# Patient Record
Sex: FEMALE | Race: WHITE | Employment: OTHER | URBAN - METROPOLITAN AREA
[De-identification: names, ages, dates, MRNs, and addresses within clinical notes are randomized per-mention and may not be internally consistent; named-entity substitution may affect disease eponyms.]

---

## 2023-01-24 PROCEDURE — 99309 SBSQ NF CARE MODERATE MDM 30: CPT | Performed by: INTERNAL MEDICINE

## 2023-02-01 ENCOUNTER — OUTSIDE SERVICES (OUTPATIENT)
Dept: PRIMARY CARE CLINIC | Age: 83
End: 2023-02-01

## 2023-02-01 DIAGNOSIS — S79.002D: Primary | ICD-10-CM

## 2023-02-01 DIAGNOSIS — R53.1 WEAKNESS: ICD-10-CM

## 2023-02-01 DIAGNOSIS — F02.B2 MODERATE ALZHEIMER'S DEMENTIA WITH PSYCHOTIC DISTURBANCE, UNSPECIFIED TIMING OF DEMENTIA ONSET (HCC): ICD-10-CM

## 2023-02-01 DIAGNOSIS — G30.9 MODERATE ALZHEIMER'S DEMENTIA WITH PSYCHOTIC DISTURBANCE, UNSPECIFIED TIMING OF DEMENTIA ONSET (HCC): ICD-10-CM

## 2023-02-01 DIAGNOSIS — U07.1 COVID-19: ICD-10-CM

## 2023-02-01 DIAGNOSIS — E03.9 HYPOTHYROIDISM, UNSPECIFIED TYPE: ICD-10-CM

## 2023-03-01 ASSESSMENT — ENCOUNTER SYMPTOMS: COUGH: 1

## 2023-03-01 ASSESSMENT — VISUAL ACUITY: OU: 1

## 2023-03-01 NOTE — PROGRESS NOTES
Visit Date: 2/1/23  Gino Martini  1940  female [de-identified] y.o. Subjective:    CC: Patient presents with left femoral neck fracture and covid. Patient presents for follow up of dementia, weakness, and hypothyroidism. HPI:  Hip Pain   The incident occurred at home. The injury mechanism was a fall (had a fall at home and was found to have a displaced femerol neck fracture had surgery done and admitted to the nursing home for rehab). The pain is present in the left hip. The quality of the pain is described as aching. The pain is at a severity of 3/10. The pain is mild. The pain has been Constant since onset. Associated symptoms include muscle weakness. She reports no foreign bodies present. The treatment provided mild relief. Memory Loss    Patient reports onset of memory loss was more than 1 year ago. Onset quality is gradual.     Symptoms associated with memory loss include changes in short-term memory, changes in long-term memory, difficulty recalling words and disorientation outside familiar environments. Patient lives in a/an family member's home. Cough  This is a new (covid +) problem. The current episode started in the past 7 days. The problem has been gradually improving. Extremity Weakness  This is a new problem. The current episode started in the past 7 days. The problem occurs intermittently. The problem has been gradually improving. Associated symptoms include coughing. Treatments tried: pt/ot eval. The treatment provided mild relief. Thyroid Problem  Presents for follow-up (hypothyroidism) visit. The symptoms have been stable. Her past medical history is significant for dementia. ROS:  Review of Systems   Unable to perform ROS: Dementia   Respiratory:  Positive for cough. Current Meds: Refer to nursing home record    PMH:    Medical Problems:   No past medical history on file. Unable to obtain due to dementia     Surgical Hx:   No past surgical history on file.  Unable to obtain due to dementia     FH:   No family history on file. Unable to obtain due to dementia     SH: Unable to obtain due to dementia          Objective: Wt: 94 BP: 126/78 Pulse: 70 Resp: 18 T: 97.9F     Physical Exam:  Physical Exam  Vitals reviewed. Constitutional:       General: She is awake. She is not in acute distress. Appearance: She is underweight. She is not ill-appearing or toxic-appearing. Comments: Confused Appears appropriate for age   HENT:      Head: Normocephalic and atraumatic. Right Ear: Tympanic membrane and external ear normal.      Left Ear: Tympanic membrane and external ear normal.      Ears:      Comments: Middle ear well aerated. Nose: Nose normal.      Mouth/Throat:      Mouth: Mucous membranes are moist.      Dentition: Normal dentition. No gingival swelling or dental caries. Pharynx: Oropharynx is clear. Uvula midline. Comments: Gums appear healthy. No thrush no mucositis  Eyes:      General: Vision grossly intact. Extraocular Movements: Extraocular movements intact. Conjunctiva/sclera: Conjunctivae normal.      Pupils: Pupils are equal, round, and reactive to light. Comments: Fundoscopic exam is benign  Retinal vessels: Normal   Neck:      Vascular: No carotid bruit. Comments: Thyroid is normal in size. Carotids 2+ and equal bilaterally  Cardiovascular:      Rate and Rhythm: Normal rate and regular rhythm. Pulses: Normal pulses. Heart sounds: Normal heart sounds, S1 normal and S2 normal. No murmur heard. No friction rub. No gallop. Comments: Pedal pulses 2+ and equal bilaterally  Pulmonary:      Effort: Pulmonary effort is normal.      Breath sounds: Normal breath sounds. Abdominal:      General: Bowel sounds are normal. There is no distension. Palpations: Abdomen is soft. There is no mass. Tenderness: There is no abdominal tenderness. There is no guarding or rebound. Hernia: No hernia is present.       Comments: No rigidity   Musculoskeletal:         General: Normal range of motion. Right shoulder: Normal.      Left shoulder: Normal.      Right upper arm: Normal.      Left upper arm: Normal.      Cervical back: Normal range of motion. Right hip: Normal.      Left hip: Normal.      Right upper leg: Normal.      Left upper leg: Normal.      Right lower leg: Normal.      Left lower leg: Normal.      Right foot: Normal.      Left foot: Normal.      Comments: Incision on left hip healing well    Lymphadenopathy:      Comments: No palpable or visible regional lymphadenopathy   Skin:     General: Skin is warm and dry. Findings: No bruising, ecchymosis, lesion or rash. Neurological:      General: No focal deficit present. Mental Status: Mental status is at baseline. Cranial Nerves: No cranial nerve deficit. Deep Tendon Reflexes: Reflexes normal.   Psychiatric:         Mood and Affect: Mood and affect normal. Mood is not depressed. Behavior: Behavior is not agitated. Behavior is cooperative. Cognition and Memory: Cognition and memory normal.      Comments: No apparent anxiety       Assessment & Plan:  1. Physeal fracture of proximal end of left femur with routine healing, unspecified physeal fracture configuration, subsequent encounter  2. Moderate Alzheimer's dementia with psychotic disturbance, unspecified timing of dementia onset (HCC)  3. Weakness  4. COVID-19  5. Hypothyroidism, unspecified type    Hospital notes reviewed   Chart and medications reviewed   Pt/ot eval and treat   Check labs   Continue current treatment   Advanced directives as per family she is a dnrcc      Roland BRIONES MA  am scribing for Dr. Jailene Galvan.  Helen Medrano, [employer]     IJan MD, personally performed the services described in this documentation as scribed by Roland Pace and it is both accurate and complete

## 2023-03-08 ENCOUNTER — OUTSIDE SERVICES (OUTPATIENT)
Dept: PRIMARY CARE CLINIC | Age: 83
End: 2023-03-08

## 2023-03-08 DIAGNOSIS — R53.1 WEAKNESS: ICD-10-CM

## 2023-03-08 DIAGNOSIS — E03.9 HYPOTHYROIDISM, UNSPECIFIED TYPE: ICD-10-CM

## 2023-03-08 DIAGNOSIS — F02.B2 MODERATE ALZHEIMER'S DEMENTIA WITH PSYCHOTIC DISTURBANCE, UNSPECIFIED TIMING OF DEMENTIA ONSET (HCC): ICD-10-CM

## 2023-03-08 DIAGNOSIS — S79.002D: Primary | ICD-10-CM

## 2023-03-08 DIAGNOSIS — G30.9 MODERATE ALZHEIMER'S DEMENTIA WITH PSYCHOTIC DISTURBANCE, UNSPECIFIED TIMING OF DEMENTIA ONSET (HCC): ICD-10-CM

## 2023-04-26 ASSESSMENT — VISUAL ACUITY: OU: 1

## 2023-05-16 ENCOUNTER — OUTSIDE SERVICES (OUTPATIENT)
Dept: PRIMARY CARE CLINIC | Age: 83
End: 2023-05-16
Payer: MEDICARE

## 2023-05-16 DIAGNOSIS — E03.9 HYPOTHYROIDISM, UNSPECIFIED TYPE: ICD-10-CM

## 2023-05-16 DIAGNOSIS — G30.9 MODERATE ALZHEIMER'S DEMENTIA WITH PSYCHOTIC DISTURBANCE, UNSPECIFIED TIMING OF DEMENTIA ONSET (HCC): Primary | ICD-10-CM

## 2023-05-16 DIAGNOSIS — F02.B2 MODERATE ALZHEIMER'S DEMENTIA WITH PSYCHOTIC DISTURBANCE, UNSPECIFIED TIMING OF DEMENTIA ONSET (HCC): Primary | ICD-10-CM

## 2023-05-16 DIAGNOSIS — R53.1 WEAKNESS: ICD-10-CM

## 2023-05-16 PROCEDURE — 99309 SBSQ NF CARE MODERATE MDM 30: CPT | Performed by: INTERNAL MEDICINE

## 2023-05-30 ASSESSMENT — VISUAL ACUITY: OU: 1

## 2023-05-30 NOTE — PROGRESS NOTES
Visit Date: 4/12/23  Eddie [place]  1940  female 82 y.o. Subjective:    CC: Patient presents with left femoral neck fracture and covid. Patient presents for follow up of dementia, weakness, and hypothyroidism. HPI:  Hip Pain   The incident occurred at home. The injury mechanism was a fall (had a fall at home and was found to have a displaced femerol neck fracture had surgery done she is working with therapy she is getting better slowly sometimes does not want to participate in therapy due to her dementia). The pain is present in the left hip. The quality of the pain is described as aching. The pain is at a severity of 0/10. The pain is mild. The pain has been Constant since onset. Associated symptoms include muscle weakness. She reports no foreign bodies present. Treatments tried: working with therapy. The treatment provided mild relief. Memory Loss    Patient reports onset of memory loss was more than 1 year ago. Onset quality is gradual.     Symptoms associated with memory loss include changes in short-term memory, changes in long-term memory, difficulty recalling words and disorientation outside familiar environments. Patient lives in a/an family member's home. Extremity Weakness  This is a new problem. The current episode started more than 1 month ago. The problem occurs intermittently. The problem has been gradually improving. Treatments tried: working with therapy. The treatment provided mild relief. Thyroid Problem  Presents for follow-up (hypothyroidism) visit. The symptoms have been stable. Her past medical history is significant for dementia. ROS:  Review of Systems   Unable to perform ROS: Dementia      Current Meds: Refer to nursing home record    PMH:    Medical Problems:   No past medical history on file. Unable to obtain due to dementia     Surgical Hx:   No past surgical history on file. Unable to obtain due to dementia     FH:   No family history on file.  Unable to obtain due to

## 2023-06-13 ASSESSMENT — VISUAL ACUITY: OU: 1

## 2023-06-21 ENCOUNTER — OUTSIDE SERVICES (OUTPATIENT)
Dept: PRIMARY CARE CLINIC | Age: 83
End: 2023-06-21
Payer: MEDICARE

## 2023-06-21 DIAGNOSIS — E03.9 HYPOTHYROIDISM, UNSPECIFIED TYPE: ICD-10-CM

## 2023-06-21 DIAGNOSIS — F02.B2 MODERATE ALZHEIMER'S DEMENTIA WITH PSYCHOTIC DISTURBANCE, UNSPECIFIED TIMING OF DEMENTIA ONSET (HCC): Primary | ICD-10-CM

## 2023-06-21 DIAGNOSIS — R53.1 WEAKNESS: ICD-10-CM

## 2023-06-21 DIAGNOSIS — G30.9 MODERATE ALZHEIMER'S DEMENTIA WITH PSYCHOTIC DISTURBANCE, UNSPECIFIED TIMING OF DEMENTIA ONSET (HCC): Primary | ICD-10-CM

## 2023-06-21 PROCEDURE — 99309 SBSQ NF CARE MODERATE MDM 30: CPT | Performed by: INTERNAL MEDICINE

## 2023-06-28 ASSESSMENT — VISUAL ACUITY: OU: 1

## 2023-07-05 ENCOUNTER — OUTSIDE SERVICES (OUTPATIENT)
Dept: PRIMARY CARE CLINIC | Age: 83
End: 2023-07-05
Payer: MEDICARE

## 2023-07-05 DIAGNOSIS — R13.12 DYSPHAGIA, OROPHARYNGEAL PHASE: Primary | ICD-10-CM

## 2023-07-05 PROCEDURE — 99308 SBSQ NF CARE LOW MDM 20: CPT | Performed by: INTERNAL MEDICINE

## 2023-07-12 ENCOUNTER — OUTSIDE SERVICES (OUTPATIENT)
Dept: PRIMARY CARE CLINIC | Age: 83
End: 2023-07-12
Payer: MEDICARE

## 2023-07-12 DIAGNOSIS — G30.9 MODERATE ALZHEIMER'S DEMENTIA WITH PSYCHOTIC DISTURBANCE, UNSPECIFIED TIMING OF DEMENTIA ONSET (HCC): ICD-10-CM

## 2023-07-12 DIAGNOSIS — R53.1 WEAKNESS: ICD-10-CM

## 2023-07-12 DIAGNOSIS — E03.9 HYPOTHYROIDISM, UNSPECIFIED TYPE: ICD-10-CM

## 2023-07-12 DIAGNOSIS — F02.B2 MODERATE ALZHEIMER'S DEMENTIA WITH PSYCHOTIC DISTURBANCE, UNSPECIFIED TIMING OF DEMENTIA ONSET (HCC): ICD-10-CM

## 2023-07-12 DIAGNOSIS — R13.12 DYSPHAGIA, OROPHARYNGEAL PHASE: Primary | ICD-10-CM

## 2023-07-12 PROCEDURE — 99309 SBSQ NF CARE MODERATE MDM 30: CPT | Performed by: INTERNAL MEDICINE

## 2023-07-12 ASSESSMENT — VISUAL ACUITY: OU: 1

## 2023-07-18 ASSESSMENT — VISUAL ACUITY: OU: 1

## 2023-07-18 NOTE — PROGRESS NOTES
Visit Date: 7/12/23  Basil   1940  female 82 y.o. Subjective:    CC:  Patient presents for follow up of dementia, weakness, and hypothyroidism. HPI:  Memory Loss    Patient reports onset of memory loss was more than 1 year ago. Onset quality is gradual.     Symptoms associated with memory loss include changes in short-term memory, changes in long-term memory, difficulty recalling words and disorientation outside familiar environments. Patient lives in a/an family member's home. Extremity Weakness  This is a new problem. The current episode started more than 1 month ago. The problem occurs intermittently. The problem has been gradually improving. Treatments tried: working with therapy. The treatment provided mild relief. Thyroid Problem  Presents for follow-up (hypothyroidism) visit. The symptoms have been stable. Her past medical history is significant for dementia. ROS:  Review of Systems   Unable to perform ROS: Dementia      Current Meds: Refer to nursing home record    PMH:    Medical Problems:   No past medical history on file. Unable to obtain due to dementia     Surgical Hx:   No past surgical history on file. Unable to obtain due to dementia     FH:   No family history on file. Unable to obtain due to dementia     SH: Unable to obtain due to dementia     reports that she has never smoked. She has never used smokeless tobacco. She reports that she does not currently use drugs. She reports that she does not drink alcohol. Objective: Wt: 100.4 BP: 103/43 Pulse: 74 Resp: 18 T: 97.9F     Physical Exam:  Physical Exam  Vitals reviewed. Constitutional:       General: She is awake. She is not in acute distress. Appearance: She is underweight. She is not ill-appearing or toxic-appearing. Comments: Confused Appears appropriate for age   HENT:      Head: Normocephalic and atraumatic.       Right Ear: Tympanic membrane and external ear normal.      Left Ear: Tympanic membrane and

## 2023-08-09 ENCOUNTER — OUTSIDE SERVICES (OUTPATIENT)
Dept: PRIMARY CARE CLINIC | Age: 83
End: 2023-08-09
Payer: MEDICARE

## 2023-08-09 DIAGNOSIS — E03.9 HYPOTHYROIDISM, UNSPECIFIED TYPE: ICD-10-CM

## 2023-08-09 DIAGNOSIS — R13.12 DYSPHAGIA, OROPHARYNGEAL PHASE: ICD-10-CM

## 2023-08-09 DIAGNOSIS — F02.B2 MODERATE ALZHEIMER'S DEMENTIA WITH PSYCHOTIC DISTURBANCE, UNSPECIFIED TIMING OF DEMENTIA ONSET (HCC): Primary | ICD-10-CM

## 2023-08-09 DIAGNOSIS — G30.9 MODERATE ALZHEIMER'S DEMENTIA WITH PSYCHOTIC DISTURBANCE, UNSPECIFIED TIMING OF DEMENTIA ONSET (HCC): Primary | ICD-10-CM

## 2023-08-09 DIAGNOSIS — R53.1 WEAKNESS: ICD-10-CM

## 2023-08-09 PROCEDURE — 99309 SBSQ NF CARE MODERATE MDM 30: CPT | Performed by: INTERNAL MEDICINE

## 2023-08-14 ASSESSMENT — VISUAL ACUITY: OU: 1

## 2023-08-14 NOTE — PROGRESS NOTES
Visit Date: 8/9/23  Geno Love  1940  female 82 y.o. Subjective:    CC:  Patient presents for follow up of dementia, weakness, and hypothyroidism. HPI:  Memory Loss    Patient reports onset of memory loss was more than 1 year ago. Onset quality is gradual.     Symptoms associated with memory loss include changes in short-term memory, changes in long-term memory, difficulty recalling words and disorientation outside familiar environments. Patient lives in a/an family member's home. Extremity Weakness  This is a new problem. The current episode started more than 1 month ago. The problem occurs intermittently. The problem has been gradually improving. Treatments tried: working with therapy. The treatment provided mild relief. Thyroid Problem  Presents for follow-up (hypothyroidism) visit. The symptoms have been stable. Her past medical history is significant for dementia. ROS:  Review of Systems   Unable to perform ROS: Dementia      Current Meds: Refer to nursing home record    PMH:    Medical Problems:   No past medical history on file. Unable to obtain due to dementia     Surgical Hx:   No past surgical history on file. Unable to obtain due to dementia     FH:   No family history on file. Unable to obtain due to dementia     SH: Unable to obtain due to dementia     reports that she has never smoked. She has never used smokeless tobacco. She reports that she does not currently use drugs. She reports that she does not drink alcohol. Objective: Wt: 102.4 BP: 110/76 Pulse: 72 Resp: 18 T: 97.4F     Physical Exam:  Physical Exam  Vitals reviewed. Constitutional:       General: She is awake. She is not in acute distress. Appearance: She is underweight. She is not ill-appearing or toxic-appearing. Comments: Confused Appears appropriate for age   HENT:      Head: Normocephalic and atraumatic.       Right Ear: Tympanic membrane and external ear normal.      Left Ear: Tympanic membrane and

## 2023-09-20 ENCOUNTER — OUTSIDE SERVICES (OUTPATIENT)
Dept: PRIMARY CARE CLINIC | Age: 83
End: 2023-09-20

## 2023-09-20 DIAGNOSIS — F02.B2 MODERATE ALZHEIMER'S DEMENTIA WITH PSYCHOTIC DISTURBANCE, UNSPECIFIED TIMING OF DEMENTIA ONSET (HCC): Primary | ICD-10-CM

## 2023-09-20 DIAGNOSIS — E03.9 HYPOTHYROIDISM, UNSPECIFIED TYPE: ICD-10-CM

## 2023-09-20 DIAGNOSIS — G30.9 MODERATE ALZHEIMER'S DEMENTIA WITH PSYCHOTIC DISTURBANCE, UNSPECIFIED TIMING OF DEMENTIA ONSET (HCC): Primary | ICD-10-CM

## 2023-09-20 DIAGNOSIS — R53.1 WEAKNESS: ICD-10-CM

## 2023-09-20 DIAGNOSIS — R13.12 DYSPHAGIA, OROPHARYNGEAL PHASE: ICD-10-CM

## 2023-09-29 ASSESSMENT — VISUAL ACUITY: OU: 1

## 2023-09-29 NOTE — PROGRESS NOTES
external ear normal.      Ears:      Comments: Middle ear well aerated. Nose: Nose normal.      Mouth/Throat:      Mouth: Mucous membranes are moist.      Dentition: Normal dentition. No gingival swelling or dental caries. Pharynx: Oropharynx is clear. Uvula midline. Comments: Gums appear healthy. No thrush no mucositis  Eyes:      General: Vision grossly intact. Extraocular Movements: Extraocular movements intact. Conjunctiva/sclera: Conjunctivae normal.      Pupils: Pupils are equal, round, and reactive to light. Comments: Fundoscopic exam is benign  Retinal vessels: Normal   Neck:      Vascular: No carotid bruit. Comments: Thyroid is normal in size. Carotids 2+ and equal bilaterally  Cardiovascular:      Rate and Rhythm: Normal rate and regular rhythm. Pulses: Normal pulses. Heart sounds: Normal heart sounds, S1 normal and S2 normal. No murmur heard. No friction rub. No gallop. Comments: Pedal pulses 2+ and equal bilaterally  Pulmonary:      Effort: Pulmonary effort is normal.      Breath sounds: Normal breath sounds. Abdominal:      General: Bowel sounds are normal. There is no distension. Palpations: Abdomen is soft. There is no mass. Tenderness: There is no abdominal tenderness. There is no guarding or rebound. Hernia: No hernia is present. Comments: No rigidity   Musculoskeletal:         General: Normal range of motion. Right shoulder: Normal.      Left shoulder: Normal.      Right upper arm: Normal.      Left upper arm: Normal.      Cervical back: Normal range of motion. Right hip: Normal.      Left hip: Normal.      Right upper leg: Normal.      Left upper leg: Normal.      Right lower leg: Normal.      Left lower leg: Normal.      Right foot: Normal.      Left foot: Normal.   Lymphadenopathy:      Comments: No palpable or visible regional lymphadenopathy   Skin:     General: Skin is warm and dry.       Findings: No

## 2023-10-11 ENCOUNTER — OUTSIDE SERVICES (OUTPATIENT)
Dept: PRIMARY CARE CLINIC | Age: 83
End: 2023-10-11

## 2023-10-11 DIAGNOSIS — R53.1 WEAKNESS: ICD-10-CM

## 2023-10-11 DIAGNOSIS — G30.9 MODERATE ALZHEIMER'S DEMENTIA WITH PSYCHOTIC DISTURBANCE, UNSPECIFIED TIMING OF DEMENTIA ONSET (HCC): Primary | ICD-10-CM

## 2023-10-11 DIAGNOSIS — R13.12 DYSPHAGIA, OROPHARYNGEAL PHASE: ICD-10-CM

## 2023-10-11 DIAGNOSIS — S79.002D: ICD-10-CM

## 2023-10-11 DIAGNOSIS — E03.9 HYPOTHYROIDISM, UNSPECIFIED TYPE: ICD-10-CM

## 2023-10-11 DIAGNOSIS — F02.B2 MODERATE ALZHEIMER'S DEMENTIA WITH PSYCHOTIC DISTURBANCE, UNSPECIFIED TIMING OF DEMENTIA ONSET (HCC): Primary | ICD-10-CM

## 2023-10-19 ASSESSMENT — VISUAL ACUITY: OU: 1

## 2023-10-19 NOTE — PROGRESS NOTES
Visit Date: 10/11/23  Oracio Alvarado  1940  female 82 y.o. Subjective:    CC:  Patient presents for follow up of dementia, weakness, and hypothyroidism. HPI:  Memory Loss    Patient reports onset of memory loss was more than 1 year ago. Onset quality is gradual.     Symptoms associated with memory loss include changes in short-term memory, changes in long-term memory, difficulty recalling words and disorientation outside familiar environments. Patient lives in a/an family member's home. Extremity Weakness  This is a new problem. The current episode started more than 1 month ago. The problem occurs intermittently. The problem has been gradually improving. Treatments tried: working with therapy. The treatment provided mild relief. Thyroid Problem  Presents for follow-up (hypothyroidism) visit. The symptoms have been stable. Her past medical history is significant for dementia. ROS:  Review of Systems   Unable to perform ROS: Dementia        Current Meds: Refer to nursing home record    PMH:    Medical Problems:   No past medical history on file. Unable to obtain due to dementia     Surgical Hx:   No past surgical history on file. Unable to obtain due to dementia     FH:   No family history on file. Unable to obtain due to dementia     SH: Unable to obtain due to dementia     reports that she has never smoked. She has never used smokeless tobacco. She reports that she does not currently use drugs. She reports that she does not drink alcohol. Objective: Wt: 102 BP: 118/68 Pulse: 72 Resp: 18 T: 97.6F     Physical Exam:  Physical Exam  Vitals reviewed. Constitutional:       General: She is awake. She is not in acute distress. Appearance: She is underweight. She is not ill-appearing or toxic-appearing. Comments: Confused Appears appropriate for age   HENT:      Head: Normocephalic and atraumatic.       Right Ear: Tympanic membrane and external ear normal.      Left Ear: Tympanic membrane

## 2023-11-08 ENCOUNTER — OUTSIDE SERVICES (OUTPATIENT)
Dept: PRIMARY CARE CLINIC | Age: 83
End: 2023-11-08

## 2023-11-08 DIAGNOSIS — G30.9 MODERATE ALZHEIMER'S DEMENTIA WITH PSYCHOTIC DISTURBANCE, UNSPECIFIED TIMING OF DEMENTIA ONSET (HCC): Primary | ICD-10-CM

## 2023-11-08 DIAGNOSIS — E03.9 HYPOTHYROIDISM, UNSPECIFIED TYPE: ICD-10-CM

## 2023-11-08 DIAGNOSIS — R53.1 WEAKNESS: ICD-10-CM

## 2023-11-08 DIAGNOSIS — R13.12 DYSPHAGIA, OROPHARYNGEAL PHASE: ICD-10-CM

## 2023-11-08 DIAGNOSIS — D64.9 ANEMIA, UNSPECIFIED TYPE: ICD-10-CM

## 2023-11-08 DIAGNOSIS — F02.B2 MODERATE ALZHEIMER'S DEMENTIA WITH PSYCHOTIC DISTURBANCE, UNSPECIFIED TIMING OF DEMENTIA ONSET (HCC): Primary | ICD-10-CM

## 2023-11-24 ENCOUNTER — OUTSIDE SERVICES (OUTPATIENT)
Dept: PRIMARY CARE CLINIC | Age: 83
End: 2023-11-24
Payer: MEDICARE

## 2023-11-24 DIAGNOSIS — E03.9 HYPOTHYROIDISM, UNSPECIFIED TYPE: ICD-10-CM

## 2023-11-24 DIAGNOSIS — R53.1 WEAKNESS: ICD-10-CM

## 2023-11-24 DIAGNOSIS — F02.B2 MODERATE ALZHEIMER'S DEMENTIA WITH PSYCHOTIC DISTURBANCE, UNSPECIFIED TIMING OF DEMENTIA ONSET (HCC): ICD-10-CM

## 2023-11-24 DIAGNOSIS — G30.9 MODERATE ALZHEIMER'S DEMENTIA WITH PSYCHOTIC DISTURBANCE, UNSPECIFIED TIMING OF DEMENTIA ONSET (HCC): ICD-10-CM

## 2023-11-24 DIAGNOSIS — S79.002D: Primary | ICD-10-CM

## 2023-11-27 ASSESSMENT — VISUAL ACUITY: OU: 1

## 2023-11-27 NOTE — PROGRESS NOTES
Visit Date: 11/8/23  Peter Mora  1940  female 82 y.o. Subjective:    CC:  Patient presents for follow up of dementia, weakness, and hypothyroidism. HPI:  Memory Loss    Patient reports onset of memory loss was more than 1 year ago. Onset quality is gradual.     Symptoms associated with memory loss include changes in short-term memory, changes in long-term memory, difficulty recalling words and disorientation outside familiar environments. Patient lives in a/an family member's home. Extremity Weakness  This is a new problem. The current episode started more than 1 month ago. The problem occurs intermittently. The problem has been gradually improving. Treatments tried: working with therapy. The treatment provided mild relief. Thyroid Problem  Presents for follow-up (hypothyroidism) visit. The symptoms have been stable. Her past medical history is significant for dementia. Anemia  Presents for follow-up visit. There are no compliance problems. Compliance with medications: not currently taking any medications. ROS:  Review of Systems   Unable to perform ROS: Dementia        Current Meds: Refer to nursing home record    PMH:    Medical Problems:   No past medical history on file. Unable to obtain due to dementia     Surgical Hx:   No past surgical history on file. Unable to obtain due to dementia     FH:   No family history on file. Unable to obtain due to dementia     SH: Unable to obtain due to dementia     reports that she has never smoked. She has never used smokeless tobacco. She reports that she does not currently use drugs. She reports that she does not drink alcohol. Objective: Wt: 105.1 BP: 112/66 Pulse: 70 Resp: 18 T: 97.9F     Physical Exam:  Physical Exam  Vitals reviewed. Constitutional:       General: She is awake. She is not in acute distress. Appearance: She is underweight. She is not ill-appearing or toxic-appearing.       Comments: Confused Appears appropriate for age

## 2023-12-13 ENCOUNTER — OUTSIDE SERVICES (OUTPATIENT)
Dept: PRIMARY CARE CLINIC | Age: 83
End: 2023-12-13
Payer: MEDICARE

## 2023-12-13 DIAGNOSIS — E03.9 HYPOTHYROIDISM, UNSPECIFIED TYPE: ICD-10-CM

## 2023-12-13 DIAGNOSIS — G30.9 MODERATE ALZHEIMER'S DEMENTIA WITH PSYCHOTIC DISTURBANCE, UNSPECIFIED TIMING OF DEMENTIA ONSET (HCC): Primary | ICD-10-CM

## 2023-12-13 DIAGNOSIS — R53.1 WEAKNESS: ICD-10-CM

## 2023-12-13 DIAGNOSIS — R13.12 DYSPHAGIA, OROPHARYNGEAL PHASE: ICD-10-CM

## 2023-12-13 DIAGNOSIS — D64.9 ANEMIA, UNSPECIFIED TYPE: ICD-10-CM

## 2023-12-13 DIAGNOSIS — F02.B2 MODERATE ALZHEIMER'S DEMENTIA WITH PSYCHOTIC DISTURBANCE, UNSPECIFIED TIMING OF DEMENTIA ONSET (HCC): Primary | ICD-10-CM

## 2023-12-13 PROCEDURE — 99309 SBSQ NF CARE MODERATE MDM 30: CPT | Performed by: INTERNAL MEDICINE

## 2023-12-26 NOTE — PROGRESS NOTES
Lymphadenopathy:      Comments: No palpable or visible regional lymphadenopathy   Skin:     General: Skin is warm and dry. Findings: No bruising, ecchymosis, lesion or rash. Neurological:      General: No focal deficit present. Mental Status: Mental status is at baseline. Cranial Nerves: No cranial nerve deficit. Deep Tendon Reflexes: Reflexes normal.   Psychiatric:         Mood and Affect: Mood and affect normal. Mood is not depressed. Behavior: Behavior is not agitated. Behavior is cooperative. Cognition and Memory: Cognition and memory normal.      Comments: No apparent anxiety         Assessment & Plan:  1. Moderate Alzheimer's dementia with psychotic disturbance, unspecified timing of dementia onset ([address])  2. Weakness  3. Hypothyroidism, unspecified type  4. Dysphagia, oropharyngeal phase  5. Anemia, unspecified type    Chart reviewed   Medications reviewed   Dementia taking medications behaviors controlled psych following as needed   Continue working with therapy she is getting stronger slowly   Dysphagia is working with speech therapy and is improving slowly   Anemia check iron and tibc and cbc  CMP, CBC EVERY 3 MONTHS  Continue current treatment     Mireille BRIONES MA  am scribing for Dr. Dave Alexander.  Brian Khoury [place]       I, Esperanza Hammans, MD, personally performed the services described in this documentation as scribed by Mireille Rascon and it is both accurate and complete

## 2024-01-10 ENCOUNTER — OUTSIDE SERVICES (OUTPATIENT)
Dept: PRIMARY CARE CLINIC | Age: 84
End: 2024-01-10
Payer: MEDICARE

## 2024-01-10 DIAGNOSIS — R53.1 WEAKNESS: ICD-10-CM

## 2024-01-10 DIAGNOSIS — R13.12 DYSPHAGIA, OROPHARYNGEAL PHASE: ICD-10-CM

## 2024-01-10 DIAGNOSIS — G30.9 MODERATE ALZHEIMER'S DEMENTIA WITH PSYCHOTIC DISTURBANCE, UNSPECIFIED TIMING OF DEMENTIA ONSET (HCC): Primary | ICD-10-CM

## 2024-01-10 DIAGNOSIS — E03.9 HYPOTHYROIDISM, UNSPECIFIED TYPE: ICD-10-CM

## 2024-01-10 DIAGNOSIS — D64.9 ANEMIA, UNSPECIFIED TYPE: ICD-10-CM

## 2024-01-10 DIAGNOSIS — F02.B2 MODERATE ALZHEIMER'S DEMENTIA WITH PSYCHOTIC DISTURBANCE, UNSPECIFIED TIMING OF DEMENTIA ONSET (HCC): Primary | ICD-10-CM

## 2024-01-10 PROCEDURE — 99309 SBSQ NF CARE MODERATE MDM 30: CPT | Performed by: INTERNAL MEDICINE

## 2024-01-15 ASSESSMENT — VISUAL ACUITY: OU: 1

## 2024-01-15 NOTE — PROGRESS NOTES
Visit Date: 1/10/24  Ailin Hendrix  1940  female 83 y.o.      Subjective:    CC:  Patient presents for follow up of dementia, weakness, and hypothyroidism.    HPI:  Memory Loss    Patient reports onset of memory loss was more than 1 year ago. Onset quality is gradual.     Symptoms associated with memory loss include changes in short-term memory, changes in long-term memory, difficulty recalling words and disorientation outside familiar environments. Patient lives in a/an family member's home.  Extremity Weakness  This is a new problem. The current episode started more than 1 month ago. The problem occurs intermittently. The problem has been gradually improving. Treatments tried: working with therapy. The treatment provided mild relief.   Thyroid Problem  Presents for follow-up (hypothyroidism) visit. The symptoms have been stable. Her past medical history is significant for dementia.   Anemia  Presents for follow-up visit. There are no compliance problems.  Compliance with medications: not currently taking any medications.       ROS:  Review of Systems   Unable to perform ROS: Dementia        Current Meds: Refer to nursing home record    PMH:    Medical Problems:   No past medical history on file. Unable to obtain due to dementia     Surgical Hx:   No past surgical history on file. Unable to obtain due to dementia     FH:   No family history on file. Unable to obtain due to dementia     SH: Unable to obtain due to dementia     reports that she has never smoked. She has never used smokeless tobacco. She reports that she does not currently use drugs. She reports that she does not drink alcohol.     Objective:  Wt: 104 BP: 122/74 Pulse: 76 Resp: 18 T: 97.2F     Physical Exam:  Physical Exam  Vitals reviewed.   Constitutional:       General: She is awake. She is not in acute distress.     Appearance: She is underweight. She is not ill-appearing or toxic-appearing.      Comments: Confused Appears appropriate for age

## 2024-02-14 ENCOUNTER — OUTSIDE SERVICES (OUTPATIENT)
Dept: PRIMARY CARE CLINIC | Age: 84
End: 2024-02-14

## 2024-02-14 DIAGNOSIS — E03.9 HYPOTHYROIDISM, UNSPECIFIED TYPE: ICD-10-CM

## 2024-02-14 DIAGNOSIS — D64.9 ANEMIA, UNSPECIFIED TYPE: ICD-10-CM

## 2024-02-14 DIAGNOSIS — R53.1 WEAKNESS: ICD-10-CM

## 2024-02-14 DIAGNOSIS — R13.12 DYSPHAGIA, OROPHARYNGEAL PHASE: ICD-10-CM

## 2024-02-14 DIAGNOSIS — F02.B2 MODERATE ALZHEIMER'S DEMENTIA WITH PSYCHOTIC DISTURBANCE, UNSPECIFIED TIMING OF DEMENTIA ONSET (HCC): Primary | ICD-10-CM

## 2024-02-14 DIAGNOSIS — G30.9 MODERATE ALZHEIMER'S DEMENTIA WITH PSYCHOTIC DISTURBANCE, UNSPECIFIED TIMING OF DEMENTIA ONSET (HCC): Primary | ICD-10-CM

## 2024-02-29 ASSESSMENT — VISUAL ACUITY: OU: 1

## 2024-02-29 NOTE — PROGRESS NOTES
Lymphadenopathy:      Comments: No palpable or visible regional lymphadenopathy   Skin:     General: Skin is warm and dry.      Findings: No bruising, ecchymosis, lesion or rash.   Neurological:      General: No focal deficit present.      Mental Status: Mental status is at baseline.      Cranial Nerves: No cranial nerve deficit.      Deep Tendon Reflexes: Reflexes normal.   Psychiatric:         Mood and Affect: Mood and affect normal. Mood is not depressed.         Behavior: Behavior is not agitated. Behavior is cooperative.         Cognition and Memory: Cognition and memory normal.      Comments: No apparent anxiety         Assessment & Plan:  1. Moderate Alzheimer's dementia with psychotic disturbance, unspecified timing of dementia onset (HCC)  2. Weakness  3. Hypothyroidism, unspecified type  4. Dysphagia, oropharyngeal phase  5. Anemia, unspecified type    Chart reviewed   Medications reviewed   Dementia taking medications behaviors controlled psych following as needed   Continue working with therapy she is getting stronger slowly   Dysphagia is working with speech therapy and is improving slowly   Anemia check iron and tibc and cbc  CMP, CBC EVERY 3 MONTHS  Continue current treatment     I have spent a total of 30 minutes with the patient and the family reviewing the above stated recommendations.  And a total of >50% of that time involved face-to-face time providing counseling and or coordination of care with the other providers, preparation for the clinic visit, reviewing records/tests, counseling/education of the patient, ordering medications/tests/procedures, coordinating care, and documenting clinical information in the EHR.    Sera BRIONES MA  am scribing for PATY Jha Mourad L Rostom, MD, personally performed the services described in this documentation as scribed by Sera Wiseman and it is both accurate and complete

## 2024-03-13 ENCOUNTER — OUTSIDE SERVICES (OUTPATIENT)
Dept: PRIMARY CARE CLINIC | Age: 84
End: 2024-03-13

## 2024-03-13 DIAGNOSIS — E03.9 HYPOTHYROIDISM, UNSPECIFIED TYPE: ICD-10-CM

## 2024-03-13 DIAGNOSIS — R53.1 WEAKNESS: ICD-10-CM

## 2024-03-13 DIAGNOSIS — R13.12 DYSPHAGIA, OROPHARYNGEAL PHASE: ICD-10-CM

## 2024-03-13 DIAGNOSIS — F02.B2 MODERATE ALZHEIMER'S DEMENTIA WITH PSYCHOTIC DISTURBANCE, UNSPECIFIED TIMING OF DEMENTIA ONSET (HCC): Primary | ICD-10-CM

## 2024-03-13 DIAGNOSIS — G30.9 MODERATE ALZHEIMER'S DEMENTIA WITH PSYCHOTIC DISTURBANCE, UNSPECIFIED TIMING OF DEMENTIA ONSET (HCC): Primary | ICD-10-CM

## 2024-03-13 DIAGNOSIS — D64.9 ANEMIA, UNSPECIFIED TYPE: ICD-10-CM

## 2024-04-10 ENCOUNTER — OUTSIDE SERVICES (OUTPATIENT)
Dept: PRIMARY CARE CLINIC | Age: 84
End: 2024-04-10
Payer: MEDICARE

## 2024-04-10 DIAGNOSIS — D64.9 ANEMIA, UNSPECIFIED TYPE: ICD-10-CM

## 2024-04-10 DIAGNOSIS — E03.9 HYPOTHYROIDISM, UNSPECIFIED TYPE: ICD-10-CM

## 2024-04-10 DIAGNOSIS — F02.B2 MODERATE ALZHEIMER'S DEMENTIA WITH PSYCHOTIC DISTURBANCE, UNSPECIFIED TIMING OF DEMENTIA ONSET (HCC): Primary | ICD-10-CM

## 2024-04-10 DIAGNOSIS — R53.1 WEAKNESS: ICD-10-CM

## 2024-04-10 DIAGNOSIS — R13.12 DYSPHAGIA, OROPHARYNGEAL PHASE: ICD-10-CM

## 2024-04-10 DIAGNOSIS — G30.9 MODERATE ALZHEIMER'S DEMENTIA WITH PSYCHOTIC DISTURBANCE, UNSPECIFIED TIMING OF DEMENTIA ONSET (HCC): Primary | ICD-10-CM

## 2024-04-10 PROCEDURE — 99309 SBSQ NF CARE MODERATE MDM 30: CPT | Performed by: INTERNAL MEDICINE

## 2024-04-13 ASSESSMENT — VISUAL ACUITY: OU: 1

## 2024-04-13 NOTE — PROGRESS NOTES
Visit Date: 3/13/24  Ailin Hendrix  1940  female 84 y.o.      Subjective:    CC:  Patient presents for follow up of dementia, weakness, and hypothyroidism.    HPI:  Memory Loss    Patient reports onset of memory loss was more than 1 year ago. Onset quality is gradual.     Symptoms associated with memory loss include changes in short-term memory, changes in long-term memory, difficulty recalling words and disorientation outside familiar environments. Patient lives in a/an family member's home.  Extremity Weakness  This is a new problem. The current episode started more than 1 month ago. The problem occurs intermittently. The problem has been gradually improving. Treatments tried: working with therapy. The treatment provided mild relief.   Thyroid Problem  Presents for follow-up (hypothyroidism) visit. The symptoms have been stable. Her past medical history is significant for dementia.   Anemia  Presents for follow-up visit. There are no compliance problems.  Compliance with medications: not currently taking any medications.       ROS:  Review of Systems   Unable to perform ROS: Dementia        Current Meds: Refer to nursing home record    PMH:    Medical Problems:   No past medical history on file. Unable to obtain due to dementia     Surgical Hx:   No past surgical history on file. Unable to obtain due to dementia     FH:   No family history on file. Unable to obtain due to dementia     SH: Unable to obtain due to dementia     reports that she has never smoked. She has never used smokeless tobacco. She reports that she does not currently use drugs. She reports that she does not drink alcohol.     Objective:  Wt: 104 BP: 114/70 Pulse: 74 Resp: 18 T: 97.9F     Physical Exam:  Physical Exam  Vitals reviewed.   Constitutional:       General: She is awake. She is not in acute distress.     Appearance: She is underweight. She is not ill-appearing or toxic-appearing.      Comments: Confused Appears appropriate for age

## 2024-05-07 ASSESSMENT — VISUAL ACUITY: OU: 1

## 2024-05-07 NOTE — PROGRESS NOTES
or toxic-appearing.      Comments: Confused Appears appropriate for age   HENT:      Head: Normocephalic and atraumatic.      Right Ear: Tympanic membrane and external ear normal.      Left Ear: Tympanic membrane and external ear normal.      Ears:      Comments: Middle ear well aerated.     Nose: Nose normal.      Mouth/Throat:      Mouth: Mucous membranes are moist.      Dentition: Normal dentition. No gingival swelling or dental caries.      Pharynx: Oropharynx is clear. Uvula midline.      Comments: Gums appear healthy.   No thrush no mucositis  Eyes:      General: Vision grossly intact.      Extraocular Movements: Extraocular movements intact.      Conjunctiva/sclera: Conjunctivae normal.      Pupils: Pupils are equal, round, and reactive to light.   Neck:      Vascular: No carotid bruit.      Comments: Thyroid is normal in size.  Carotids 2+ and equal bilaterally  Cardiovascular:      Rate and Rhythm: Normal rate and regular rhythm.      Pulses: Normal pulses.      Heart sounds: Normal heart sounds, S1 normal and S2 normal. No murmur heard.     No friction rub. No gallop.      Comments: Pedal pulses 2+ and equal bilaterally  Pulmonary:      Effort: Pulmonary effort is normal.      Breath sounds: Normal breath sounds.   Abdominal:      General: Bowel sounds are normal. There is no distension.      Palpations: Abdomen is soft. There is no mass.      Tenderness: There is no abdominal tenderness. There is no guarding or rebound.      Hernia: No hernia is present.      Comments: No rigidity   Musculoskeletal:         General: Normal range of motion.      Right shoulder: Normal.      Left shoulder: Normal.      Right upper arm: Normal.      Left upper arm: Normal.      Cervical back: Normal range of motion.      Right hip: Normal.      Left hip: Normal.      Right upper leg: Normal.      Left upper leg: Normal.      Right lower leg: Normal.      Left lower leg: Normal.      Right foot: Normal.      Left foot:

## 2024-05-08 ENCOUNTER — OUTSIDE SERVICES (OUTPATIENT)
Dept: PRIMARY CARE CLINIC | Age: 84
End: 2024-05-08

## 2024-05-08 DIAGNOSIS — G30.9 MODERATE ALZHEIMER'S DEMENTIA WITH PSYCHOTIC DISTURBANCE, UNSPECIFIED TIMING OF DEMENTIA ONSET (HCC): Primary | ICD-10-CM

## 2024-05-08 DIAGNOSIS — E03.9 HYPOTHYROIDISM, UNSPECIFIED TYPE: ICD-10-CM

## 2024-05-08 DIAGNOSIS — F02.B2 MODERATE ALZHEIMER'S DEMENTIA WITH PSYCHOTIC DISTURBANCE, UNSPECIFIED TIMING OF DEMENTIA ONSET (HCC): Primary | ICD-10-CM

## 2024-05-08 DIAGNOSIS — R53.1 WEAKNESS: ICD-10-CM

## 2024-05-11 PROBLEM — E03.9 HYPOTHYROIDISM: Status: ACTIVE | Noted: 2024-05-11

## 2024-05-11 PROBLEM — F02.B2 MODERATE ALZHEIMER'S DEMENTIA WITH PSYCHOTIC DISTURBANCE (HCC): Status: ACTIVE | Noted: 2024-05-11

## 2024-05-11 PROBLEM — R53.1 WEAKNESS: Status: ACTIVE | Noted: 2024-05-11

## 2024-05-11 PROBLEM — G30.9 MODERATE ALZHEIMER'S DEMENTIA WITH PSYCHOTIC DISTURBANCE (HCC): Status: ACTIVE | Noted: 2024-05-11

## 2024-05-11 PROBLEM — R13.12 DYSPHAGIA, OROPHARYNGEAL PHASE: Status: ACTIVE | Noted: 2024-05-11

## 2024-05-14 ASSESSMENT — VISUAL ACUITY: OU: 1

## 2024-05-14 NOTE — PROGRESS NOTES
Visit Date: 4/10/24  Ailin Hendrix  1940  female 84 y.o.      Subjective:    CC:  Patient presents for follow up of dementia, weakness, and hypothyroidism.    HPI:  Memory Loss    Patient reports onset of memory loss was more than 1 year ago. Onset quality is gradual.     Symptoms associated with memory loss include changes in short-term memory, changes in long-term memory, difficulty recalling words and disorientation outside familiar environments. Patient lives in a/an family member's home.  Extremity Weakness  This is a new problem. The current episode started more than 1 month ago. The problem occurs intermittently. The problem has been gradually improving. Treatments tried: working with therapy. The treatment provided mild relief.   Thyroid Problem  Presents for follow-up (hypothyroidism) visit. The symptoms have been stable. Her past medical history is significant for dementia.   Anemia  Presents for follow-up visit. There are no compliance problems.  Compliance with medications: not currently taking any medications.       ROS:  Review of Systems   Unable to perform ROS: Dementia        Current Meds: Refer to nursing home record    PMH:    Medical Problems:   No past medical history on file. Unable to obtain due to dementia     Surgical Hx:   No past surgical history on file. Unable to obtain due to dementia     FH:   No family history on file. Unable to obtain due to dementia     SH: Unable to obtain due to dementia     reports that she has never smoked. She has never used smokeless tobacco. She reports that she does not currently use drugs. She reports that she does not drink alcohol.     Objective:  Wt: 102.8 BP: 114/68 Pulse: 77 Resp: 18 T: 97.9F     Physical Exam:  Physical Exam  Vitals reviewed.   Constitutional:       General: She is awake. She is not in acute distress.     Appearance: She is underweight. She is not ill-appearing or toxic-appearing.      Comments: Confused Appears appropriate for age

## 2024-06-10 ASSESSMENT — VISUAL ACUITY: OU: 1

## 2024-06-10 NOTE — PROGRESS NOTES
lymphadenopathy   Skin:     General: Skin is warm and dry.      Findings: No bruising, ecchymosis, lesion or rash.   Neurological:      General: No focal deficit present.      Mental Status: Mental status is at baseline.      Cranial Nerves: No cranial nerve deficit.      Deep Tendon Reflexes: Reflexes normal.   Psychiatric:         Mood and Affect: Mood and affect normal. Mood is not depressed.         Behavior: Behavior is not agitated. Behavior is cooperative.         Cognition and Memory: Cognition and memory normal.      Comments: No apparent anxiety         Assessment & Plan:  1. Moderate Alzheimer's dementia with psychotic disturbance, unspecified timing of dementia onset (HCC)  2. Weakness  3. Hypothyroidism, unspecified type  4. Stage 3b chronic kidney disease (HCC)  5. Anemia due to stage 3b chronic kidney disease (HCC)  6. Acquired hypothyroidism    Chart reviewed   Medications reviewed   Dementia taking medications behaviors controlled psych following as needed   Anemia due to chronic kidney disease repeat CBC in 3 months  Hypothyroidism repeat labs in 6 months patient last labs were done April 24 and showed that patient is getting the appropriate amount of levothyroxine  CMP, CBC EVERY 3 MONTHS  Continue current treatment

## 2024-06-12 ENCOUNTER — OUTSIDE SERVICES (OUTPATIENT)
Dept: PRIMARY CARE CLINIC | Age: 84
End: 2024-06-12
Payer: MEDICARE

## 2024-06-12 DIAGNOSIS — N18.32 ANEMIA DUE TO STAGE 3B CHRONIC KIDNEY DISEASE (HCC): ICD-10-CM

## 2024-06-12 DIAGNOSIS — G30.9 MODERATE ALZHEIMER'S DEMENTIA WITH PSYCHOTIC DISTURBANCE, UNSPECIFIED TIMING OF DEMENTIA ONSET (HCC): Primary | ICD-10-CM

## 2024-06-12 DIAGNOSIS — E03.9 HYPOTHYROIDISM, UNSPECIFIED TYPE: ICD-10-CM

## 2024-06-12 DIAGNOSIS — F02.B2 MODERATE ALZHEIMER'S DEMENTIA WITH PSYCHOTIC DISTURBANCE, UNSPECIFIED TIMING OF DEMENTIA ONSET (HCC): Primary | ICD-10-CM

## 2024-06-12 DIAGNOSIS — E03.9 ACQUIRED HYPOTHYROIDISM: ICD-10-CM

## 2024-06-12 DIAGNOSIS — D63.1 ANEMIA DUE TO STAGE 3B CHRONIC KIDNEY DISEASE (HCC): ICD-10-CM

## 2024-06-12 DIAGNOSIS — R53.1 WEAKNESS: ICD-10-CM

## 2024-06-12 DIAGNOSIS — N18.32 STAGE 3B CHRONIC KIDNEY DISEASE (HCC): ICD-10-CM

## 2024-06-12 PROCEDURE — 99309 SBSQ NF CARE MODERATE MDM 30: CPT | Performed by: INTERNAL MEDICINE

## 2024-07-15 ASSESSMENT — VISUAL ACUITY: OU: 1

## 2024-07-15 NOTE — PROGRESS NOTES
age   HENT:      Head: Normocephalic and atraumatic.      Right Ear: Tympanic membrane and external ear normal.      Left Ear: Tympanic membrane and external ear normal.      Ears:      Comments: Middle ear well aerated.     Nose: Nose normal.      Mouth/Throat:      Mouth: Mucous membranes are moist.      Dentition: Normal dentition. No gingival swelling or dental caries.      Pharynx: Oropharynx is clear. Uvula midline.      Comments: Gums appear healthy.   No thrush no mucositis  Eyes:      General: Vision grossly intact.      Extraocular Movements: Extraocular movements intact.      Conjunctiva/sclera: Conjunctivae normal.      Pupils: Pupils are equal, round, and reactive to light.   Neck:      Vascular: No carotid bruit.      Comments: Thyroid is normal in size.  Carotids 2+ and equal bilaterally  Cardiovascular:      Rate and Rhythm: Normal rate and regular rhythm.      Pulses: Normal pulses.      Heart sounds: Normal heart sounds, S1 normal and S2 normal. No murmur heard.     No friction rub. No gallop.      Comments: Pedal pulses 2+ and equal bilaterally  Pulmonary:      Effort: Pulmonary effort is normal.      Breath sounds: Normal breath sounds.   Abdominal:      General: Bowel sounds are normal. There is no distension.      Palpations: Abdomen is soft. There is no mass.      Tenderness: There is no abdominal tenderness. There is no guarding or rebound.      Hernia: No hernia is present.      Comments: No rigidity   Musculoskeletal:         General: Normal range of motion.      Right shoulder: Normal.      Left shoulder: Normal.      Right upper arm: Normal.      Left upper arm: Normal.      Cervical back: Normal range of motion.      Right hip: Normal.      Left hip: Normal.      Right upper leg: Normal.      Left upper leg: Normal.      Right lower leg: Normal.      Left lower leg: Normal.      Right foot: Normal.      Left foot: Normal.   Lymphadenopathy:      Comments: No palpable or visible regional

## 2024-07-17 ENCOUNTER — OUTSIDE SERVICES (OUTPATIENT)
Dept: PRIMARY CARE CLINIC | Age: 84
End: 2024-07-17
Payer: MEDICARE

## 2024-07-17 DIAGNOSIS — G30.9 MODERATE ALZHEIMER'S DEMENTIA WITH PSYCHOTIC DISTURBANCE, UNSPECIFIED TIMING OF DEMENTIA ONSET (HCC): Primary | ICD-10-CM

## 2024-07-17 DIAGNOSIS — R53.1 WEAKNESS: ICD-10-CM

## 2024-07-17 DIAGNOSIS — F02.B2 MODERATE ALZHEIMER'S DEMENTIA WITH PSYCHOTIC DISTURBANCE, UNSPECIFIED TIMING OF DEMENTIA ONSET (HCC): Primary | ICD-10-CM

## 2024-07-17 DIAGNOSIS — R13.12 DYSPHAGIA, OROPHARYNGEAL PHASE: ICD-10-CM

## 2024-07-17 DIAGNOSIS — E03.9 HYPOTHYROIDISM, UNSPECIFIED TYPE: ICD-10-CM

## 2024-07-17 DIAGNOSIS — D64.9 ANEMIA, UNSPECIFIED TYPE: ICD-10-CM

## 2024-07-17 PROCEDURE — 99309 SBSQ NF CARE MODERATE MDM 30: CPT | Performed by: INTERNAL MEDICINE

## 2024-08-12 ASSESSMENT — VISUAL ACUITY: OU: 1

## 2024-08-12 NOTE — PROGRESS NOTES
Visit Date: 8/14/24  Ailin Hendrix  1940  female 84 y.o.      Subjective:    CC:  Patient presents for follow up of dementia, weakness, and hypothyroidism.    HPI:  Memory Loss    Patient reports onset of memory loss was more than 1 year ago. Onset quality is gradual.     Symptoms associated with memory loss include changes in short-term memory, changes in long-term memory, difficulty recalling words and disorientation outside familiar environments. Patient lives in a/an family member's home.  Extremity Weakness  This is a new problem. The current episode started more than 1 month ago. The problem occurs intermittently. The problem has been gradually improving. Treatments tried: working with therapy. The treatment provided mild relief.   Thyroid Problem  Presents for follow-up (hypothyroidism) visit. The symptoms have been stable. Her past medical history is significant for dementia.   Anemia  Presents for follow-up visit. There are no compliance problems.  Compliance with medications: not currently taking any medications.       ROS:  Review of Systems   Unable to perform ROS: Dementia        Current Meds: Refer to nursing home record    PMH:    Medical Problems:   History reviewed. No pertinent past medical history. Unable to obtain due to dementia     Surgical Hx:   History reviewed. No pertinent surgical history. Unable to obtain due to dementia     FH:   History reviewed. No pertinent family history. Unable to obtain due to dementia     SH: Unable to obtain due to dementia     reports that she has never smoked. She has never used smokeless tobacco. She reports that she does not currently use drugs. She reports that she does not drink alcohol.     Objective:  Vital signs for Ailin was reviewed in the EMR chart.    Physical Exam  Vitals reviewed.   Constitutional:       General: She is awake. She is not in acute distress.     Appearance: She is underweight. She is not ill-appearing or toxic-appearing.

## 2024-08-14 ENCOUNTER — OUTSIDE SERVICES (OUTPATIENT)
Dept: PRIMARY CARE CLINIC | Age: 84
End: 2024-08-14

## 2024-08-14 DIAGNOSIS — E03.9 HYPOTHYROIDISM, UNSPECIFIED TYPE: ICD-10-CM

## 2024-08-14 DIAGNOSIS — G30.9 MODERATE ALZHEIMER'S DEMENTIA WITH PSYCHOTIC DISTURBANCE, UNSPECIFIED TIMING OF DEMENTIA ONSET (HCC): Primary | ICD-10-CM

## 2024-08-14 DIAGNOSIS — R13.12 DYSPHAGIA, OROPHARYNGEAL PHASE: ICD-10-CM

## 2024-08-14 DIAGNOSIS — F02.B2 MODERATE ALZHEIMER'S DEMENTIA WITH PSYCHOTIC DISTURBANCE, UNSPECIFIED TIMING OF DEMENTIA ONSET (HCC): Primary | ICD-10-CM

## 2024-08-14 DIAGNOSIS — N18.31 ANEMIA DUE TO STAGE 3A CHRONIC KIDNEY DISEASE (HCC): ICD-10-CM

## 2024-08-14 DIAGNOSIS — D63.1 ANEMIA DUE TO STAGE 3A CHRONIC KIDNEY DISEASE (HCC): ICD-10-CM

## 2024-08-15 PROBLEM — N18.31 ANEMIA DUE TO STAGE 3A CHRONIC KIDNEY DISEASE (HCC): Status: ACTIVE | Noted: 2024-06-12

## 2024-09-09 ASSESSMENT — VISUAL ACUITY: OU: 1

## 2024-09-11 ENCOUNTER — OUTSIDE SERVICES (OUTPATIENT)
Dept: PRIMARY CARE CLINIC | Age: 84
End: 2024-09-11
Payer: MEDICARE

## 2024-09-11 DIAGNOSIS — G30.9 MODERATE ALZHEIMER'S DEMENTIA WITH PSYCHOTIC DISTURBANCE, UNSPECIFIED TIMING OF DEMENTIA ONSET (HCC): Primary | ICD-10-CM

## 2024-09-11 DIAGNOSIS — E03.9 HYPOTHYROIDISM, UNSPECIFIED TYPE: ICD-10-CM

## 2024-09-11 DIAGNOSIS — F02.B2 MODERATE ALZHEIMER'S DEMENTIA WITH PSYCHOTIC DISTURBANCE, UNSPECIFIED TIMING OF DEMENTIA ONSET (HCC): Primary | ICD-10-CM

## 2024-09-11 DIAGNOSIS — D64.9 ANEMIA, UNSPECIFIED TYPE: ICD-10-CM

## 2024-09-11 DIAGNOSIS — R53.1 WEAKNESS: ICD-10-CM

## 2024-09-11 DIAGNOSIS — R13.12 DYSPHAGIA, OROPHARYNGEAL PHASE: ICD-10-CM

## 2024-09-11 PROCEDURE — 99309 SBSQ NF CARE MODERATE MDM 30: CPT | Performed by: INTERNAL MEDICINE

## 2024-10-10 NOTE — PROGRESS NOTES
Visit Date: 10/16/24  Ailin Hendrix  1940  female 84 y.o.      Subjective:    CC:  Patient presents for follow up of dementia, weakness, and hypothyroidism.    HPI:  Memory Loss    Patient reports onset of memory loss was more than 1 year ago. Onset quality is gradual.     Symptoms associated with memory loss include changes in short-term memory, changes in long-term memory, difficulty recalling words and disorientation outside familiar environments. Patient lives in a/an family member's home.  Extremity Weakness  This is a new problem. The current episode started more than 1 month ago. The problem occurs intermittently. The problem has been gradually improving. Treatments tried: working with therapy. The treatment provided mild relief.   Thyroid Problem  Presents for follow-up (hypothyroidism) visit. The symptoms have been stable.   Anemia  Presents for follow-up visit. There are no compliance problems.  Compliance with medications: not currently taking any medications.       ROS:  Review of Systems   Unable to perform ROS: Dementia        Current Meds: Refer to nursing home record    PMH:    Medical Problems:   No past medical history on file. Unable to obtain due to dementia     Surgical Hx:   No past surgical history on file. Unable to obtain due to dementia     FH:   No family history on file. Unable to obtain due to dementia     SH: Unable to obtain due to dementia     reports that she has never smoked. She has never used smokeless tobacco. She reports that she does not currently use drugs. She reports that she does not drink alcohol.     Objective:  Vital signs for Ailin was reviewed in the EMR chart.    Physical Exam  Vitals reviewed.   Constitutional:       General: She is awake. She is not in acute distress.     Appearance: She is underweight. She is not ill-appearing or toxic-appearing.      Comments: Confused Appears appropriate for age   HENT:      Head: Normocephalic and atraumatic.      Right Ear:

## 2024-10-16 ENCOUNTER — OUTSIDE SERVICES (OUTPATIENT)
Dept: PRIMARY CARE CLINIC | Age: 84
End: 2024-10-16

## 2024-10-16 DIAGNOSIS — R13.12 DYSPHAGIA, OROPHARYNGEAL PHASE: ICD-10-CM

## 2024-10-16 DIAGNOSIS — G30.9 MODERATE ALZHEIMER'S DEMENTIA WITH PSYCHOTIC DISTURBANCE, UNSPECIFIED TIMING OF DEMENTIA ONSET (HCC): Primary | ICD-10-CM

## 2024-10-16 DIAGNOSIS — D64.9 ANEMIA, UNSPECIFIED TYPE: ICD-10-CM

## 2024-10-16 DIAGNOSIS — F02.B2 MODERATE ALZHEIMER'S DEMENTIA WITH PSYCHOTIC DISTURBANCE, UNSPECIFIED TIMING OF DEMENTIA ONSET (HCC): Primary | ICD-10-CM

## 2024-10-16 DIAGNOSIS — R53.1 WEAKNESS: ICD-10-CM

## 2024-10-16 DIAGNOSIS — E03.9 HYPOTHYROIDISM, UNSPECIFIED TYPE: ICD-10-CM

## 2024-11-07 ASSESSMENT — VISUAL ACUITY: OU: 1

## 2024-11-08 ENCOUNTER — OUTSIDE SERVICES (OUTPATIENT)
Dept: PRIMARY CARE CLINIC | Age: 84
End: 2024-11-08

## 2024-11-08 DIAGNOSIS — D63.1 ANEMIA DUE TO STAGE 3B CHRONIC KIDNEY DISEASE (HCC): ICD-10-CM

## 2024-11-08 DIAGNOSIS — E03.9 HYPOTHYROIDISM, UNSPECIFIED TYPE: ICD-10-CM

## 2024-11-08 DIAGNOSIS — R13.12 DYSPHAGIA, OROPHARYNGEAL PHASE: ICD-10-CM

## 2024-11-08 DIAGNOSIS — G30.9 MODERATE ALZHEIMER'S DEMENTIA WITH PSYCHOTIC DISTURBANCE, UNSPECIFIED TIMING OF DEMENTIA ONSET (HCC): Primary | ICD-10-CM

## 2024-11-08 DIAGNOSIS — D64.9 ANEMIA, UNSPECIFIED TYPE: ICD-10-CM

## 2024-11-08 DIAGNOSIS — N18.32 STAGE 3B CHRONIC KIDNEY DISEASE (HCC): ICD-10-CM

## 2024-11-08 DIAGNOSIS — N18.31 ANEMIA DUE TO STAGE 3A CHRONIC KIDNEY DISEASE (HCC): ICD-10-CM

## 2024-11-08 DIAGNOSIS — N18.32 ANEMIA DUE TO STAGE 3B CHRONIC KIDNEY DISEASE (HCC): ICD-10-CM

## 2024-11-08 DIAGNOSIS — D63.1 ANEMIA DUE TO STAGE 3A CHRONIC KIDNEY DISEASE (HCC): ICD-10-CM

## 2024-11-08 DIAGNOSIS — E03.9 ACQUIRED HYPOTHYROIDISM: ICD-10-CM

## 2024-11-08 DIAGNOSIS — F02.B2 MODERATE ALZHEIMER'S DEMENTIA WITH PSYCHOTIC DISTURBANCE, UNSPECIFIED TIMING OF DEMENTIA ONSET (HCC): Primary | ICD-10-CM

## 2024-11-08 DIAGNOSIS — S79.002D: ICD-10-CM

## 2024-11-08 DIAGNOSIS — R53.1 WEAKNESS: ICD-10-CM

## 2024-12-10 ASSESSMENT — VISUAL ACUITY: OU: 1

## 2024-12-10 NOTE — PROGRESS NOTES
Visit Date: 12/11/2024  Ailin Hendrix  1940  female 84 y.o.      Subjective:    CC:  Patient presents for follow up of dementia, weakness, and hypothyroidism.    HPI:  Memory Loss    Patient reports onset of memory loss was more than 1 year ago. Onset quality is gradual.     Symptoms associated with memory loss include changes in short-term memory, changes in long-term memory, difficulty recalling words and disorientation outside familiar environments. Patient lives in a/an family member's home.  Extremity Weakness  This is a new problem. The current episode started more than 1 month ago. The problem occurs intermittently. The problem has been gradually improving. Treatments tried: working with therapy. The treatment provided mild relief.   Thyroid Problem  Presents for follow-up (hypothyroidism) visit. The symptoms have been stable. Her past medical history is significant for dementia.   Anemia  Presents for follow-up visit. There are no compliance problems.  Compliance with medications: not currently taking any medications.       ROS:  Review of Systems   Unable to perform ROS: Dementia        Current Meds: Refer to nursing home record    PMH:    Medical Problems:   No past medical history on file. Unable to obtain due to dementia     Surgical Hx:   No past surgical history on file. Unable to obtain due to dementia     FH:   No family history on file. Unable to obtain due to dementia     SH: Unable to obtain due to dementia     reports that she has never smoked. She has never used smokeless tobacco. She reports that she does not currently use drugs. She reports that she does not drink alcohol.     Objective:  Vital signs for Ailin was reviewed in the EMR chart.    Physical Exam  Vitals reviewed.   Constitutional:       General: She is awake. She is not in acute distress.     Appearance: She is underweight. She is not ill-appearing or toxic-appearing.      Comments: Confused Appears appropriate for age   HENT:

## 2024-12-11 ENCOUNTER — OUTSIDE SERVICES (OUTPATIENT)
Dept: PRIMARY CARE CLINIC | Age: 84
End: 2024-12-11

## 2024-12-11 DIAGNOSIS — R13.12 DYSPHAGIA, OROPHARYNGEAL PHASE: ICD-10-CM

## 2024-12-11 DIAGNOSIS — N18.32 STAGE 3B CHRONIC KIDNEY DISEASE (HCC): ICD-10-CM

## 2024-12-11 DIAGNOSIS — N18.31 ANEMIA DUE TO STAGE 3A CHRONIC KIDNEY DISEASE (HCC): ICD-10-CM

## 2024-12-11 DIAGNOSIS — D63.1 ANEMIA DUE TO STAGE 3A CHRONIC KIDNEY DISEASE (HCC): ICD-10-CM

## 2024-12-11 DIAGNOSIS — E03.9 HYPOTHYROIDISM, UNSPECIFIED TYPE: ICD-10-CM

## 2024-12-11 DIAGNOSIS — E03.9 ACQUIRED HYPOTHYROIDISM: ICD-10-CM

## 2024-12-11 DIAGNOSIS — F02.B2 MODERATE ALZHEIMER'S DEMENTIA WITH PSYCHOTIC DISTURBANCE, UNSPECIFIED TIMING OF DEMENTIA ONSET (HCC): Primary | ICD-10-CM

## 2024-12-11 DIAGNOSIS — G30.9 MODERATE ALZHEIMER'S DEMENTIA WITH PSYCHOTIC DISTURBANCE, UNSPECIFIED TIMING OF DEMENTIA ONSET (HCC): Primary | ICD-10-CM

## 2024-12-12 PROBLEM — N18.32 STAGE 3B CHRONIC KIDNEY DISEASE (HCC): Status: ACTIVE | Noted: 2024-12-12

## 2025-01-17 ASSESSMENT — VISUAL ACUITY: OU: 1

## 2025-01-17 NOTE — PROGRESS NOTES
Visit Date: 1/18/25  Ailin Hendrix  1940  female 84 y.o.      Subjective:    CC:  Patient presents for follow up of dementia, weakness, and hypothyroidism.    HPI:  Memory Loss    Patient reports onset of memory loss was more than 1 year ago. Onset quality is gradual.     Symptoms associated with memory loss include changes in short-term memory, changes in long-term memory, difficulty recalling words and disorientation outside familiar environments. Patient lives in a/an family member's home.  Extremity Weakness  This is a new problem. The current episode started more than 1 month ago. The problem occurs intermittently. The problem has been gradually improving. Treatments tried: working with therapy. The treatment provided mild relief.   Thyroid Problem  Presents for follow-up (hypothyroidism) visit. The symptoms have been stable. Her past medical history is significant for dementia.   Anemia  Presents for follow-up visit. There are no compliance problems.  Compliance with medications: not currently taking any medications.   Chronic kidney disease  Last blood work from November 2024 showed EGFR of 38 indicating stage IIIb chronic kidney disease        ROS:  Review of Systems   Unable to perform ROS: Dementia        Current Meds: Refer to nursing home record    PMH:    Medical Problems:   No past medical history on file. Unable to obtain due to dementia     Surgical Hx:   No past surgical history on file. Unable to obtain due to dementia     FH:   No family history on file. Unable to obtain due to dementia     SH: Unable to obtain due to dementia     reports that she has never smoked. She has never used smokeless tobacco. She reports that she does not currently use drugs. She reports that she does not drink alcohol.     Objective:  Vital signs for Ailin was reviewed in the EMR chart.    Physical Exam  Vitals reviewed.   Constitutional:       General: She is awake. She is not in acute distress.     Appearance: She

## 2025-01-18 ENCOUNTER — OUTSIDE SERVICES (OUTPATIENT)
Dept: PRIMARY CARE CLINIC | Age: 85
End: 2025-01-18

## 2025-01-18 DIAGNOSIS — G30.9 MODERATE ALZHEIMER'S DEMENTIA WITH PSYCHOTIC DISTURBANCE, UNSPECIFIED TIMING OF DEMENTIA ONSET (HCC): Primary | ICD-10-CM

## 2025-01-18 DIAGNOSIS — R13.12 DYSPHAGIA, OROPHARYNGEAL PHASE: ICD-10-CM

## 2025-01-18 DIAGNOSIS — N18.32 ANEMIA DUE TO STAGE 3B CHRONIC KIDNEY DISEASE (HCC): ICD-10-CM

## 2025-01-18 DIAGNOSIS — N18.32 STAGE 3B CHRONIC KIDNEY DISEASE (HCC): ICD-10-CM

## 2025-01-18 DIAGNOSIS — F02.B2 MODERATE ALZHEIMER'S DEMENTIA WITH PSYCHOTIC DISTURBANCE, UNSPECIFIED TIMING OF DEMENTIA ONSET (HCC): Primary | ICD-10-CM

## 2025-01-18 DIAGNOSIS — E03.9 ACQUIRED HYPOTHYROIDISM: ICD-10-CM

## 2025-01-18 DIAGNOSIS — D63.1 ANEMIA DUE TO STAGE 3B CHRONIC KIDNEY DISEASE (HCC): ICD-10-CM

## 2025-01-18 DIAGNOSIS — E03.9 HYPOTHYROIDISM, UNSPECIFIED TYPE: ICD-10-CM

## 2025-02-13 ENCOUNTER — OUTSIDE SERVICES (OUTPATIENT)
Dept: PRIMARY CARE CLINIC | Age: 85
End: 2025-02-13

## 2025-02-13 DIAGNOSIS — N18.32 STAGE 3B CHRONIC KIDNEY DISEASE (HCC): ICD-10-CM

## 2025-02-13 DIAGNOSIS — N18.32 ANEMIA DUE TO STAGE 3B CHRONIC KIDNEY DISEASE (HCC): ICD-10-CM

## 2025-02-13 DIAGNOSIS — R13.12 DYSPHAGIA, OROPHARYNGEAL PHASE: ICD-10-CM

## 2025-02-13 DIAGNOSIS — D63.1 ANEMIA DUE TO STAGE 3B CHRONIC KIDNEY DISEASE (HCC): ICD-10-CM

## 2025-02-13 DIAGNOSIS — F02.B2 MODERATE ALZHEIMER'S DEMENTIA WITH PSYCHOTIC DISTURBANCE, UNSPECIFIED TIMING OF DEMENTIA ONSET (HCC): Primary | ICD-10-CM

## 2025-02-13 DIAGNOSIS — S79.002D: ICD-10-CM

## 2025-02-13 DIAGNOSIS — E03.9 ACQUIRED HYPOTHYROIDISM: ICD-10-CM

## 2025-02-13 DIAGNOSIS — G30.9 MODERATE ALZHEIMER'S DEMENTIA WITH PSYCHOTIC DISTURBANCE, UNSPECIFIED TIMING OF DEMENTIA ONSET (HCC): Primary | ICD-10-CM

## 2025-02-13 DIAGNOSIS — E03.9 HYPOTHYROIDISM, UNSPECIFIED TYPE: ICD-10-CM

## 2025-02-13 DIAGNOSIS — R53.1 WEAKNESS: ICD-10-CM

## 2025-02-13 ASSESSMENT — VISUAL ACUITY: OU: 1

## 2025-02-13 NOTE — PROGRESS NOTES
Visit Date: 2/13/25  Ailin Hendrix  1940  female 84 y.o.      Subjective:    CC:  Patient presents for follow up of dementia, weakness, and hypothyroidism.    HPI:  Memory Loss    Patient reports onset of memory loss was more than 1 year ago. Onset quality is gradual.     Symptoms associated with memory loss include changes in short-term memory, changes in long-term memory, difficulty recalling words and disorientation outside familiar environments. Patient lives in a/an family member's home.  Extremity Weakness  This is a new problem. The current episode started more than 1 month ago. The problem occurs intermittently. The problem has been gradually improving. Treatments tried: working with therapy. The treatment provided mild relief.   Thyroid Problem  Presents for follow-up (hypothyroidism) visit. The symptoms have been stable.   Anemia  Presents for follow-up visit. There are no compliance problems.  Compliance with medications: not currently taking any medications.   Chronic kidney disease  Last blood work from November 2024 showed EGFR of 38 indicating stage IIIb chronic kidney disease        ROS:  Review of Systems   Unable to perform ROS: Dementia        Current Meds: Refer to nursing home record    PMH:    Medical Problems:   No past medical history on file. Unable to obtain due to dementia     Surgical Hx:   No past surgical history on file. Unable to obtain due to dementia     FH:   No family history on file. Unable to obtain due to dementia     SH: Unable to obtain due to dementia     reports that she has never smoked. She has never used smokeless tobacco. She reports that she does not currently use drugs. She reports that she does not drink alcohol.     Objective:  Vital signs for Ailin was reviewed in the EMR chart.    Physical Exam  Vitals reviewed.   Constitutional:       General: She is awake. She is not in acute distress.     Appearance: She is underweight. She is not ill-appearing or

## 2025-03-05 NOTE — PROGRESS NOTES
Visit Date: 3/12/25  Ailin Hendrix  1940  female 84 y.o.      Subjective:    CC:  Patient presents for follow up of dementia, weakness, and hypothyroidism.    HPI:  Memory Loss    Patient reports onset of memory loss was more than 1 year ago. Onset quality is gradual.     Symptoms associated with memory loss include changes in short-term memory, changes in long-term memory, difficulty recalling words and disorientation outside familiar environments. Patient lives in a/an family member's home.  Extremity Weakness  This is a new problem. The current episode started more than 1 month ago. The problem occurs intermittently. The problem has been gradually improving. Treatments tried: working with therapy. The treatment provided mild relief.   Thyroid Problem  Presents for follow-up (hypothyroidism) visit. The symptoms have been stable.   Anemia  Presents for follow-up visit. There are no compliance problems.  Compliance with medications: not currently taking any medications.   Chronic kidney disease  Last blood work from November 2024 showed EGFR of 38 indicating stage IIIb chronic kidney disease        ROS:  Review of Systems   Unable to perform ROS: Dementia        Current Meds: Refer to nursing home record    PMH:    Medical Problems:   No past medical history on file. Unable to obtain due to dementia     Surgical Hx:   No past surgical history on file. Unable to obtain due to dementia     FH:   No family history on file. Unable to obtain due to dementia     SH: Unable to obtain due to dementia     reports that she has never smoked. She has never used smokeless tobacco. She reports that she does not currently use drugs. She reports that she does not drink alcohol.     Objective:  Vital signs for Ailin was reviewed in the EMR chart.    Physical Exam  Vitals reviewed.   Constitutional:       General: She is awake. She is not in acute distress.     Appearance: She is underweight. She is not ill-appearing or

## 2025-03-12 ENCOUNTER — OUTSIDE SERVICES (OUTPATIENT)
Dept: PRIMARY CARE CLINIC | Age: 85
End: 2025-03-12

## 2025-03-12 DIAGNOSIS — E03.9 ACQUIRED HYPOTHYROIDISM: ICD-10-CM

## 2025-03-12 DIAGNOSIS — G30.9 MODERATE ALZHEIMER'S DEMENTIA WITH PSYCHOTIC DISTURBANCE, UNSPECIFIED TIMING OF DEMENTIA ONSET (HCC): Primary | ICD-10-CM

## 2025-03-12 DIAGNOSIS — D63.1 ANEMIA DUE TO STAGE 3B CHRONIC KIDNEY DISEASE: ICD-10-CM

## 2025-03-12 DIAGNOSIS — E03.9 HYPOTHYROIDISM, UNSPECIFIED TYPE: ICD-10-CM

## 2025-03-12 DIAGNOSIS — F02.B2 MODERATE ALZHEIMER'S DEMENTIA WITH PSYCHOTIC DISTURBANCE, UNSPECIFIED TIMING OF DEMENTIA ONSET (HCC): Primary | ICD-10-CM

## 2025-03-12 DIAGNOSIS — N18.31 ANEMIA DUE TO STAGE 3A CHRONIC KIDNEY DISEASE: ICD-10-CM

## 2025-03-12 DIAGNOSIS — N18.32 STAGE 3B CHRONIC KIDNEY DISEASE (HCC): ICD-10-CM

## 2025-03-12 DIAGNOSIS — R53.1 WEAKNESS: ICD-10-CM

## 2025-03-12 DIAGNOSIS — D63.1 ANEMIA DUE TO STAGE 3A CHRONIC KIDNEY DISEASE: ICD-10-CM

## 2025-03-12 DIAGNOSIS — N18.32 ANEMIA DUE TO STAGE 3B CHRONIC KIDNEY DISEASE: ICD-10-CM

## 2025-03-12 DIAGNOSIS — R13.12 DYSPHAGIA, OROPHARYNGEAL PHASE: ICD-10-CM

## 2025-03-12 DIAGNOSIS — S79.002D: ICD-10-CM

## 2025-04-08 ASSESSMENT — VISUAL ACUITY: OU: 1

## 2025-04-08 NOTE — PROGRESS NOTES
Visit Date: 4/9/25  Ailin Hendrix  1940  female 85 y.o.      Subjective:    CC:  Patient presents for follow up of dementia, weakness, and hypothyroidism.    HPI:  Memory Loss    Patient reports onset of memory loss was more than 1 year ago. Onset quality is gradual.     Symptoms associated with memory loss include change in short-term memory, change in long-term memory, difficulty recalling words and disorientation outside familiar environments. Patient lives in a/an family member's home.  Extremity Weakness  This is a new problem. The current episode started more than 1 month ago. The problem occurs intermittently. The problem has been gradually improving. Treatments tried: working with therapy. The treatment provided mild relief.   Thyroid Problem  Presents for follow-up (hypothyroidism) visit. The symptoms have been stable.   Anemia  Presents for follow-up visit. There are no compliance problems.  Compliance with medications: not currently taking any medications.   Chronic kidney disease  Last blood work from November 2024 showed EGFR of 38 indicating stage IIIb chronic kidney disease        ROS:  Review of Systems   Unable to perform ROS: Dementia        Current Meds: Refer to nursing home record    PMH:    Medical Problems:   No past medical history on file. Unable to obtain due to dementia     Surgical Hx:   No past surgical history on file. Unable to obtain due to dementia     FH:   No family history on file. Unable to obtain due to dementia     SH: Unable to obtain due to dementia     reports that she has never smoked. She has never used smokeless tobacco. She reports that she does not currently use drugs. She reports that she does not drink alcohol.     Objective:  Vital signs for Ailin was reviewed in the EMR chart.    Physical Exam  Vitals reviewed.   Constitutional:       General: She is awake. She is not in acute distress.     Appearance: She is underweight. She is not ill-appearing or

## 2025-04-09 ENCOUNTER — OUTSIDE SERVICES (OUTPATIENT)
Dept: PRIMARY CARE CLINIC | Age: 85
End: 2025-04-09

## 2025-04-09 DIAGNOSIS — D63.1 ANEMIA DUE TO STAGE 3B CHRONIC KIDNEY DISEASE: ICD-10-CM

## 2025-04-09 DIAGNOSIS — N18.32 STAGE 3B CHRONIC KIDNEY DISEASE (HCC): ICD-10-CM

## 2025-04-09 DIAGNOSIS — S79.002D: ICD-10-CM

## 2025-04-09 DIAGNOSIS — N18.31 ANEMIA DUE TO STAGE 3A CHRONIC KIDNEY DISEASE: ICD-10-CM

## 2025-04-09 DIAGNOSIS — R53.1 WEAKNESS: ICD-10-CM

## 2025-04-09 DIAGNOSIS — E03.9 ACQUIRED HYPOTHYROIDISM: ICD-10-CM

## 2025-04-09 DIAGNOSIS — E03.9 HYPOTHYROIDISM, UNSPECIFIED TYPE: ICD-10-CM

## 2025-04-09 DIAGNOSIS — R13.12 DYSPHAGIA, OROPHARYNGEAL PHASE: ICD-10-CM

## 2025-04-09 DIAGNOSIS — G30.9 MODERATE ALZHEIMER'S DEMENTIA WITH PSYCHOTIC DISTURBANCE, UNSPECIFIED TIMING OF DEMENTIA ONSET (HCC): Primary | ICD-10-CM

## 2025-04-09 DIAGNOSIS — F02.B2 MODERATE ALZHEIMER'S DEMENTIA WITH PSYCHOTIC DISTURBANCE, UNSPECIFIED TIMING OF DEMENTIA ONSET (HCC): Primary | ICD-10-CM

## 2025-04-09 DIAGNOSIS — N18.32 ANEMIA DUE TO STAGE 3B CHRONIC KIDNEY DISEASE: ICD-10-CM

## 2025-04-09 DIAGNOSIS — D63.1 ANEMIA DUE TO STAGE 3A CHRONIC KIDNEY DISEASE: ICD-10-CM

## 2025-05-20 NOTE — PROGRESS NOTES
toxic-appearing.      Comments: Confused Appears appropriate for age   HENT:      Head: Normocephalic and atraumatic.      Right Ear: Tympanic membrane and external ear normal.      Left Ear: Tympanic membrane and external ear normal.      Ears:      Comments: Middle ear well aerated.     Nose: Nose normal.      Mouth/Throat:      Mouth: Mucous membranes are moist.      Dentition: Normal dentition. No gingival swelling or dental caries.      Pharynx: Oropharynx is clear. Uvula midline.      Comments: Gums appear healthy.   No thrush no mucositis  Eyes:      General: Vision grossly intact.      Extraocular Movements: Extraocular movements intact.      Conjunctiva/sclera: Conjunctivae normal.      Pupils: Pupils are equal, round, and reactive to light.   Neck:      Vascular: No carotid bruit.      Comments: Thyroid is normal in size.  Carotids 2+ and equal bilaterally  Cardiovascular:      Rate and Rhythm: Normal rate and regular rhythm.      Pulses: Normal pulses.      Heart sounds: Normal heart sounds, S1 normal and S2 normal. No murmur heard.     No friction rub. No gallop.      Comments: Pedal pulses 2+ and equal bilaterally  Pulmonary:      Effort: Pulmonary effort is normal.      Breath sounds: Normal breath sounds.   Abdominal:      General: Bowel sounds are normal. There is no distension.      Palpations: Abdomen is soft. There is no mass.      Tenderness: There is no abdominal tenderness. There is no guarding or rebound.      Hernia: No hernia is present.      Comments: No rigidity   Musculoskeletal:         General: Normal range of motion.      Right shoulder: Normal.      Left shoulder: Normal.      Right upper arm: Normal.      Left upper arm: Normal.      Cervical back: Normal range of motion.      Right hip: Normal.      Left hip: Normal.      Right upper leg: Normal.      Left upper leg: Normal.      Right lower leg: Normal.      Left lower leg: Normal.      Right foot: Normal.      Left foot: Normal.

## 2025-05-21 ENCOUNTER — OUTSIDE SERVICES (OUTPATIENT)
Dept: PRIMARY CARE CLINIC | Age: 85
End: 2025-05-21
Payer: MEDICARE

## 2025-05-21 DIAGNOSIS — F02.B2 MODERATE ALZHEIMER'S DEMENTIA WITH PSYCHOTIC DISTURBANCE, UNSPECIFIED TIMING OF DEMENTIA ONSET (HCC): Primary | ICD-10-CM

## 2025-05-21 DIAGNOSIS — S79.002D: ICD-10-CM

## 2025-05-21 DIAGNOSIS — R53.1 WEAKNESS: ICD-10-CM

## 2025-05-21 DIAGNOSIS — N18.31 ANEMIA DUE TO STAGE 3A CHRONIC KIDNEY DISEASE (HCC): ICD-10-CM

## 2025-05-21 DIAGNOSIS — D63.1 ANEMIA DUE TO STAGE 3B CHRONIC KIDNEY DISEASE (HCC): ICD-10-CM

## 2025-05-21 DIAGNOSIS — R13.12 DYSPHAGIA, OROPHARYNGEAL PHASE: ICD-10-CM

## 2025-05-21 DIAGNOSIS — D64.9 ANEMIA, UNSPECIFIED TYPE: ICD-10-CM

## 2025-05-21 DIAGNOSIS — E03.9 ACQUIRED HYPOTHYROIDISM: ICD-10-CM

## 2025-05-21 DIAGNOSIS — E03.9 HYPOTHYROIDISM, UNSPECIFIED TYPE: ICD-10-CM

## 2025-05-21 DIAGNOSIS — N18.32 ANEMIA DUE TO STAGE 3B CHRONIC KIDNEY DISEASE (HCC): ICD-10-CM

## 2025-05-21 DIAGNOSIS — N18.32 STAGE 3B CHRONIC KIDNEY DISEASE (HCC): ICD-10-CM

## 2025-05-21 DIAGNOSIS — G30.9 MODERATE ALZHEIMER'S DEMENTIA WITH PSYCHOTIC DISTURBANCE, UNSPECIFIED TIMING OF DEMENTIA ONSET (HCC): Primary | ICD-10-CM

## 2025-05-21 DIAGNOSIS — D63.1 ANEMIA DUE TO STAGE 3A CHRONIC KIDNEY DISEASE (HCC): ICD-10-CM

## 2025-05-21 PROCEDURE — 99309 SBSQ NF CARE MODERATE MDM 30: CPT | Performed by: INTERNAL MEDICINE

## 2025-06-10 NOTE — PROGRESS NOTES
Visit Date: 6/11/25  Ailin Hendrix  1940  female 85 y.o.      Subjective:    CC:  Patient presents for follow up of dementia, weakness, and hypothyroidism.    HPI:  Memory Loss    Patient reports onset of memory loss was more than 1 year ago. Onset quality is gradual.     Symptoms associated with memory loss include change in short-term memory, change in long-term memory, difficulty recalling words and disorientation outside familiar environments. Patient lives in a/an family member's home.  Extremity Weakness  This is a new problem. The current episode started more than 1 month ago. The problem occurs intermittently. The problem has been gradually improving. Treatments tried: working with therapy. The treatment provided mild relief.   Thyroid Problem  Presents for follow-up (hypothyroidism) visit. The symptoms have been stable.   Anemia  Presents for follow-up visit. There are no compliance problems.  Compliance with medications: not currently taking any medications.   Chronic kidney disease  Last blood work from November 2024 showed EGFR of 38 indicating stage IIIb chronic kidney disease        ROS:  Review of Systems   Unable to perform ROS: Dementia        Current Meds: Refer to nursing home record    PMH:    Medical Problems:   No past medical history on file. Unable to obtain due to dementia     Surgical Hx:   No past surgical history on file. Unable to obtain due to dementia     FH:   No family history on file. Unable to obtain due to dementia     SH: Unable to obtain due to dementia     reports that she has never smoked. She has never used smokeless tobacco. She reports that she does not currently use drugs. She reports that she does not drink alcohol.     Objective:  Vital signs for Ailin was reviewed in the EMR chart.    Physical Exam  Vitals reviewed.   Constitutional:       General: She is awake. She is not in acute distress.     Appearance: She is underweight. She is not ill-appearing or

## 2025-06-11 ENCOUNTER — OUTSIDE SERVICES (OUTPATIENT)
Dept: PRIMARY CARE CLINIC | Age: 85
End: 2025-06-11

## 2025-06-11 DIAGNOSIS — N18.32 ANEMIA DUE TO STAGE 3B CHRONIC KIDNEY DISEASE (HCC): ICD-10-CM

## 2025-06-11 DIAGNOSIS — D63.1 ANEMIA DUE TO STAGE 3A CHRONIC KIDNEY DISEASE (HCC): ICD-10-CM

## 2025-06-11 DIAGNOSIS — N18.32 STAGE 3B CHRONIC KIDNEY DISEASE (HCC): ICD-10-CM

## 2025-06-11 DIAGNOSIS — E03.9 ACQUIRED HYPOTHYROIDISM: ICD-10-CM

## 2025-06-11 DIAGNOSIS — N18.31 ANEMIA DUE TO STAGE 3A CHRONIC KIDNEY DISEASE (HCC): ICD-10-CM

## 2025-06-11 DIAGNOSIS — E03.9 HYPOTHYROIDISM, UNSPECIFIED TYPE: ICD-10-CM

## 2025-06-11 DIAGNOSIS — F02.B2 MODERATE ALZHEIMER'S DEMENTIA WITH PSYCHOTIC DISTURBANCE, UNSPECIFIED TIMING OF DEMENTIA ONSET (HCC): Primary | ICD-10-CM

## 2025-06-11 DIAGNOSIS — R13.12 DYSPHAGIA, OROPHARYNGEAL PHASE: ICD-10-CM

## 2025-06-11 DIAGNOSIS — S79.002D: ICD-10-CM

## 2025-06-11 DIAGNOSIS — D63.1 ANEMIA DUE TO STAGE 3B CHRONIC KIDNEY DISEASE (HCC): ICD-10-CM

## 2025-06-11 DIAGNOSIS — R53.1 WEAKNESS: ICD-10-CM

## 2025-06-11 DIAGNOSIS — G30.9 MODERATE ALZHEIMER'S DEMENTIA WITH PSYCHOTIC DISTURBANCE, UNSPECIFIED TIMING OF DEMENTIA ONSET (HCC): Primary | ICD-10-CM

## 2025-07-10 ASSESSMENT — VISUAL ACUITY: OU: 1

## 2025-07-10 NOTE — PROGRESS NOTES
Visit Date: 7/11/25  Ailin Hendrix  1940  female 85 y.o.      Subjective:    CC:  Patient presents for follow up of dementia, weakness, and hypothyroidism.    HPI:  Memory Loss    Patient reports onset of memory loss was more than 1 year ago. Onset quality is gradual.     Symptoms associated with memory loss include change in short-term memory, change in long-term memory, difficulty recalling words and disorientation outside familiar environments. Patient lives in a/an family member's home.  Extremity Weakness  This is a new problem. The current episode started more than 1 month ago. The problem occurs intermittently. The problem has been gradually improving. Treatments tried: working with therapy. The treatment provided mild relief.   Thyroid Problem  Presents for follow-up (hypothyroidism) visit. The symptoms have been stable.   Anemia  Presents for follow-up visit. There are no compliance problems.  Compliance with medications: not currently taking any medications.   Chronic kidney disease  Last blood work from November 2024 showed EGFR of 38 indicating stage IIIb chronic kidney disease        ROS:  Review of Systems   Unable to perform ROS: Dementia        Current Meds: Refer to nursing home record    PMH:    Medical Problems:   No past medical history on file. Unable to obtain due to dementia     Surgical Hx:   No past surgical history on file. Unable to obtain due to dementia     FH:   No family history on file. Unable to obtain due to dementia     SH: Unable to obtain due to dementia     reports that she has never smoked. She has never used smokeless tobacco. She reports that she does not currently use drugs. She reports that she does not drink alcohol.     Objective:  Vital signs for Ailin was reviewed in the EMR chart.    Physical Exam  Vitals reviewed.   Constitutional:       General: She is awake. She is not in acute distress.     Appearance: She is underweight. She is not ill-appearing or

## 2025-07-11 ENCOUNTER — OUTSIDE SERVICES (OUTPATIENT)
Dept: PRIMARY CARE CLINIC | Age: 85
End: 2025-07-11

## 2025-07-11 DIAGNOSIS — N18.32 ANEMIA DUE TO STAGE 3B CHRONIC KIDNEY DISEASE (HCC): ICD-10-CM

## 2025-07-11 DIAGNOSIS — D63.1 ANEMIA DUE TO STAGE 3B CHRONIC KIDNEY DISEASE (HCC): ICD-10-CM

## 2025-07-11 DIAGNOSIS — N18.31 ANEMIA DUE TO STAGE 3A CHRONIC KIDNEY DISEASE (HCC): ICD-10-CM

## 2025-07-11 DIAGNOSIS — F02.B2 MODERATE ALZHEIMER'S DEMENTIA WITH PSYCHOTIC DISTURBANCE, UNSPECIFIED TIMING OF DEMENTIA ONSET (HCC): Primary | ICD-10-CM

## 2025-07-11 DIAGNOSIS — R13.12 DYSPHAGIA, OROPHARYNGEAL PHASE: ICD-10-CM

## 2025-07-11 DIAGNOSIS — D63.1 ANEMIA DUE TO STAGE 3A CHRONIC KIDNEY DISEASE (HCC): ICD-10-CM

## 2025-07-11 DIAGNOSIS — N18.32 STAGE 3B CHRONIC KIDNEY DISEASE (HCC): ICD-10-CM

## 2025-07-11 DIAGNOSIS — R53.1 WEAKNESS: ICD-10-CM

## 2025-07-11 DIAGNOSIS — G30.9 MODERATE ALZHEIMER'S DEMENTIA WITH PSYCHOTIC DISTURBANCE, UNSPECIFIED TIMING OF DEMENTIA ONSET (HCC): Primary | ICD-10-CM

## 2025-07-11 DIAGNOSIS — S79.002D: ICD-10-CM

## 2025-07-11 DIAGNOSIS — E03.9 HYPOTHYROIDISM, UNSPECIFIED TYPE: ICD-10-CM

## 2025-07-11 DIAGNOSIS — D64.9 ANEMIA, UNSPECIFIED TYPE: ICD-10-CM

## 2025-07-11 DIAGNOSIS — E03.9 ACQUIRED HYPOTHYROIDISM: ICD-10-CM

## 2025-08-13 ENCOUNTER — OUTSIDE SERVICES (OUTPATIENT)
Dept: PRIMARY CARE CLINIC | Age: 85
End: 2025-08-13
Payer: MEDICARE

## 2025-08-13 DIAGNOSIS — D63.1 ANEMIA DUE TO STAGE 3A CHRONIC KIDNEY DISEASE (HCC): ICD-10-CM

## 2025-08-13 DIAGNOSIS — E03.9 ACQUIRED HYPOTHYROIDISM: ICD-10-CM

## 2025-08-13 DIAGNOSIS — N18.32 ANEMIA DUE TO STAGE 3B CHRONIC KIDNEY DISEASE (HCC): ICD-10-CM

## 2025-08-13 DIAGNOSIS — R53.1 WEAKNESS: ICD-10-CM

## 2025-08-13 DIAGNOSIS — F02.B2 MODERATE ALZHEIMER'S DEMENTIA WITH PSYCHOTIC DISTURBANCE, UNSPECIFIED TIMING OF DEMENTIA ONSET (HCC): Primary | ICD-10-CM

## 2025-08-13 DIAGNOSIS — N18.31 ANEMIA DUE TO STAGE 3A CHRONIC KIDNEY DISEASE (HCC): ICD-10-CM

## 2025-08-13 DIAGNOSIS — N18.32 STAGE 3B CHRONIC KIDNEY DISEASE (HCC): ICD-10-CM

## 2025-08-13 DIAGNOSIS — D64.9 ANEMIA, UNSPECIFIED TYPE: ICD-10-CM

## 2025-08-13 DIAGNOSIS — S79.002D: ICD-10-CM

## 2025-08-13 DIAGNOSIS — D63.1 ANEMIA DUE TO STAGE 3B CHRONIC KIDNEY DISEASE (HCC): ICD-10-CM

## 2025-08-13 DIAGNOSIS — E03.9 HYPOTHYROIDISM, UNSPECIFIED TYPE: ICD-10-CM

## 2025-08-13 DIAGNOSIS — G30.9 MODERATE ALZHEIMER'S DEMENTIA WITH PSYCHOTIC DISTURBANCE, UNSPECIFIED TIMING OF DEMENTIA ONSET (HCC): Primary | ICD-10-CM

## 2025-08-13 DIAGNOSIS — R13.12 DYSPHAGIA, OROPHARYNGEAL PHASE: ICD-10-CM

## 2025-08-13 PROCEDURE — 99309 SBSQ NF CARE MODERATE MDM 30: CPT | Performed by: INTERNAL MEDICINE
